# Patient Record
Sex: MALE | URBAN - NONMETROPOLITAN AREA
[De-identification: names, ages, dates, MRNs, and addresses within clinical notes are randomized per-mention and may not be internally consistent; named-entity substitution may affect disease eponyms.]

---

## 2022-04-08 ENCOUNTER — TELEPHONE (OUTPATIENT)
Dept: SURGERY | Age: 52
End: 2022-04-08

## 2022-04-08 NOTE — TELEPHONE ENCOUNTER
Attempted call x3 to patient on 03/24/22, 03/29/22, and 04/05/22, Left messages in 2 dates one date telephone was hung up on and attempted a return conner with no answer. Patient has not to todays date 04/08/22 returned call to office to schedule screening colonoscopy. Referral scanned to Media for future reference.

## 2022-05-05 ENCOUNTER — TELEPHONE (OUTPATIENT)
Dept: SURGERY | Age: 52
End: 2022-05-05

## 2022-05-05 RX ORDER — OMEGA-3/DHA/EPA/FISH OIL 910-1400MG
1 CAPSULE ORAL DAILY
COMMUNITY

## 2022-05-05 RX ORDER — PRAVASTATIN SODIUM 80 MG/1
80 TABLET ORAL DAILY
COMMUNITY

## 2022-05-05 RX ORDER — FAMOTIDINE 20 MG
1 TABLET ORAL DAILY
COMMUNITY

## 2022-05-05 RX ORDER — ASCORBIC ACID 500 MG
1000 TABLET ORAL DAILY
COMMUNITY

## 2022-05-05 NOTE — TELEPHONE ENCOUNTER
[facility] Colonoscopy Worksheet    Patient Name: Erica Guzman Case  : 1970  Primary Care Physician: Dr. Beverley Johnson Date: 2022    Surgery Location:   [][place] [] [place] [x] [facility] [] [facility]    Why has a Colonoscopy been recommended for you? 2nd Colonoscopy, Due for Repeat    To properly code your procedure we must ask the following questions. PLEASE CHECK ALL THAT APPLY. Are you currently having any of the following symptoms? no    [] Rectal Bleeding (K62.5) [] Bloody Stool (K92.1) [] Dark Tarry Stool (K92.1)  [] Fecal Occult Positive Blood (confirmed by blood test R19.5)  [] Anemia (low hemoglobin D64.9) [] Abdominal Pain (R10.84) [] Diarrhea (R19.7)    **If you have any of these symptoms your colonoscopy is diagnostic and must be coded as such. It will not be coded as a screening colonoscopy. If you have no symptoms but have a personal history of polyps or a family history of colon cancer you fall in the high risk-screening category and we need to know more information. If you have had a polyp or polyps removed at your last colonoscopy then the first scope after that is considered diagnostic. Also if you have irritable bowel syndrome Chron's disease, diverticulitis or colon cancer then the scope would be a diagnostic colonoscopy. Have you ever had a colonoscopy? [x] Yes  [] No    If so, where and when was that done? [facility], [place] approx 5+ yrs ago    Was anything found during the last scope? Polyp     Was it removed? Yes    Do you have a family history of colon cancer? No      Have you personally been diagnosed with colon cancer? No    Any tobacco use? [] Yes    [x] No    Any alcohol use? [] Yes    [x] No  If yes, how often? In the last six (6) months have you experienced any of the following symptoms?    [] Blood from the rectum or stool  [] Abdominal Pain   [] Diarrhea  [] Itching of rectum   [] Vomiting  [] Constipation [] Black stools  [] Bloating   [] Mucous in your stool  [] [place]   [] Change in bowel habits    Do you have allergies? [x] Yes  If yes, please list: Penicillin  [] No    Do you take Warfarin, Coumadin, Plavix, Eliquis, Xarelto, or aspirin OR do you take a medication that thins your blood? [] Yes  [x] No    Please list all of your medications including over-the-counter and herbal supplements  Omega 3 fish oil    Pravastatin        Vitamin D    Vitamin C          List your past surgical procedures    Colonoscopy    Appendectomy    [place] teeth extraction                   Medical History  Do you have any history of:  [] None [] Heart Disease [x] Hypertension  [] Diabetes [] Seizures [] Respiratory/Asthma  [] Sleep Apnea [] G.E.R.D [] Blood Disorder  [] Vascular Disease [] Depression    List the medical problems you are being treated for    Hypertension (currently not on meds)    Hyperlidipedmia                        History of MRSA? No        Have you check with your insurance company to see what you BENEFITS are id you have had a colonoscopy? If you have not check with them we encourage you to find this information out before the procedure. Below are codes you may need to five them.        CPT and Diagnosis: FOR OFFICE USE ONLY  59794 Diagnostic colonoscopy- All patients Symptoms (check above or list):   61473 Screening colonoscopy for NON-MEDICARE patients Diagnosis code: Z12.11   Screening colonoscopy for MEDICARE patients Diagnosis code: Z12.11   Screening colonoscopy for MEDICARE- HIGH RISK PATIENTS   Z85.038- Personal history malignant neoplasm, large intestine   Z85.048- Personal history malignant neoplasm, rectum/anus   Z86.010- Personal history colon polyps   Z80.0- Family history malignant neoplasm   Z83.79- Family history digestive disorder    Reviewed by nurse: Jason Erwin      SURGERY SCHEDULED FOR 05/27/2022        ADDITIONAL NOTES: